# Patient Record
Sex: MALE | Race: WHITE
[De-identification: names, ages, dates, MRNs, and addresses within clinical notes are randomized per-mention and may not be internally consistent; named-entity substitution may affect disease eponyms.]

---

## 2018-03-04 ENCOUNTER — HOSPITAL ENCOUNTER (EMERGENCY)
Dept: HOSPITAL 45 - C.EDB | Age: 63
Discharge: HOME | End: 2018-03-04
Payer: COMMERCIAL

## 2018-03-04 VITALS
BODY MASS INDEX: 34.83 KG/M2 | BODY MASS INDEX: 34.83 KG/M2 | HEIGHT: 70.51 IN | WEIGHT: 246.04 LBS | HEIGHT: 70.51 IN | WEIGHT: 246.04 LBS

## 2018-03-04 VITALS — DIASTOLIC BLOOD PRESSURE: 99 MMHG | SYSTOLIC BLOOD PRESSURE: 143 MMHG | HEART RATE: 63 BPM | OXYGEN SATURATION: 97 %

## 2018-03-04 VITALS — TEMPERATURE: 98.42 F

## 2018-03-04 DIAGNOSIS — Z79.82: ICD-10-CM

## 2018-03-04 DIAGNOSIS — R51: ICD-10-CM

## 2018-03-04 DIAGNOSIS — R10.9: ICD-10-CM

## 2018-03-04 DIAGNOSIS — I31.9: ICD-10-CM

## 2018-03-04 DIAGNOSIS — Z82.49: ICD-10-CM

## 2018-03-04 DIAGNOSIS — R06.09: ICD-10-CM

## 2018-03-04 DIAGNOSIS — R07.2: Primary | ICD-10-CM

## 2018-03-04 DIAGNOSIS — R03.0: ICD-10-CM

## 2018-03-04 LAB
ALBUMIN SERPL-MCNC: 3.8 GM/DL (ref 3.4–5)
ALP SERPL-CCNC: 87 U/L (ref 45–117)
ALT SERPL-CCNC: 33 U/L (ref 12–78)
AST SERPL-CCNC: 21 U/L (ref 15–37)
BASOPHILS # BLD: 0.05 K/UL (ref 0–0.2)
BASOPHILS NFR BLD: 0.9 %
BUN SERPL-MCNC: 16 MG/DL (ref 7–18)
CALCIUM SERPL-MCNC: 8.9 MG/DL (ref 8.5–10.1)
CK MB SERPL-MCNC: 1.8 NG/ML (ref 0.5–3.6)
CO2 SERPL-SCNC: 28 MMOL/L (ref 21–32)
CREAT SERPL-MCNC: 1.14 MG/DL (ref 0.6–1.4)
EOS ABS #: 0.09 K/UL (ref 0–0.5)
EOSINOPHIL NFR BLD AUTO: 197 K/UL (ref 130–400)
GLUCOSE SERPL-MCNC: 140 MG/DL (ref 70–99)
HCT VFR BLD CALC: 47 % (ref 42–52)
HGB BLD-MCNC: 16.7 G/DL (ref 14–18)
IG#: 0.02 K/UL (ref 0–0.02)
IMM GRANULOCYTES NFR BLD AUTO: 25.2 %
INR PPP: 1 (ref 0.9–1.1)
LIPASE: 202 U/L (ref 73–393)
LYMPHOCYTES # BLD: 1.46 K/UL (ref 1.2–3.4)
MCH RBC QN AUTO: 30 PG (ref 25–34)
MCHC RBC AUTO-ENTMCNC: 35.5 G/DL (ref 32–36)
MCV RBC AUTO: 84.4 FL (ref 80–100)
MONO ABS #: 0.51 K/UL (ref 0.11–0.59)
MONOCYTES NFR BLD: 8.8 %
NEUT ABS #: 3.67 K/UL (ref 1.4–6.5)
NEUTROPHILS # BLD AUTO: 1.6 %
NEUTROPHILS NFR BLD AUTO: 63.2 %
PMV BLD AUTO: 10.2 FL (ref 7.4–10.4)
POTASSIUM SERPL-SCNC: 3.8 MMOL/L (ref 3.5–5.1)
PROT SERPL-MCNC: 7.3 GM/DL (ref 6.4–8.2)
PTT PATIENT: 27.3 SECONDS (ref 21–31)
RED CELL DISTRIBUTION WIDTH CV: 12.3 % (ref 11.5–14.5)
RED CELL DISTRIBUTION WIDTH SD: 37.4 FL (ref 36.4–46.3)
SODIUM SERPL-SCNC: 138 MMOL/L (ref 136–145)
WBC # BLD AUTO: 5.8 K/UL (ref 4.8–10.8)

## 2018-03-04 NOTE — DIAGNOSTIC IMAGING REPORT
SINGLE VIEW CHEST



CLINICAL HISTORY:  Generalized abdominal pain.



FINDINGS: An AP, portable, upright chest radiograph is compared to study dated

4/23/2006. The examination is degraded by portable technique and patient

rotation.  The heart is mildly enlarged. The pulmonary vasculature is

noncongested. Chronic interstitial thickening is similar to previous. No

airspace consolidation or large pleural effusion is identified. No pneumothorax

is seen. The bony thorax is grossly intact.



IMPRESSION: Mild cardiac enlargement with no acute cardiopulmonary abnormality.







Electronically signed by:  Eliezer Thibodeaux M.D.

3/4/2018 9:40 AM



Dictated Date/Time:  3/4/2018 9:39 AM

## 2018-03-04 NOTE — EMERGENCY ROOM VISIT NOTE
History


Report prepared by José:  Prakash Hoffman


Under the Supervision of:  Dr. John Mcduffie D.O.


First contact with patient:  09:11


Chief Complaint:  CHEST PAIN


Stated Complaint:  BURNING IN CHEST





History of Present Illness


The patient is a 63 year old male who presents to the Emergency Room with 

complaints of worsening waxing and waning chest pain for the past few days. He 

states that the pain is a burning sensation, and he states that it is worsened 

with laying down and improved with standing up. The patient notes that the pain 

went into his abdomen and head yesterday, and he notes that he is more short of 

breath with exertion recently. He notes that he took 1 81mg aspirin this morning

, and this did not help with the pain. He denies any leg swelling, black stools

, and bloody stools. He notes that the pain is not worse with exertion. The 

patient states that he takes atenolol and losartan. The patient states that he 

had a stress test a long time ago, and he denies any history of heart attacks, 

though he states that he had a history of inflammation around the heart. He 

reports that he does not smoke or drink alcohol, he has not had any surgeries, 

and he denies any reflux symptoms. The patient states that his mother had to 

have stents put in due to heart problems in her 60s.





   Source of History:  patient


   Onset:  the past few days


   Position:  chest


   Quality:  burning


   Timing:  waxes/wanes, worsening


   Modifying Factors (Worsening):  other (laying down)


   Modifying Factors (Relieving):  other (standing up)


   Associated Symptoms:  + SOB





Review of Systems


See HPI for pertinent positives & negatives. A total of 10 systems reviewed and 

were otherwise negative.





Past Medical & Surgical


Medical Problems:


(1) Pericarditis








Family History





Heart disease





Social History


Smoking Status:  Never Smoker


Marital Status:  


Housing Status:  lives with family


Occupation Status:  retired





Current/Historical Medications


Scheduled


Acetaminophen (Tylenol), 3 MG PO UD


Aspirin (Aspirin Ec), 81 MG PO QAM


Atenolol (Tenormin), 100 MG PO QAM


Losartan Potassium (Cozaar), 100 MG PO QAM


Omeprazole (Prilosec), 20 MG PO DAILY


Pravastatin (Pravachol ), 10 MG PO QAM





Scheduled PRN


Fluoxetine (Prozac), 20 MG PO DAILY PRN for Anxiety





Allergies


Coded Allergies:  


     No Known Allergies (Unverified , 3/4/18)





Physical Exam


Vital Signs











  Date Time  Temp Pulse Resp B/P (MAP) Pulse Ox O2 Delivery O2 Flow Rate FiO2


 


3/4/18 12:37  63 18 143/99 97 Room Air  


 


3/4/18 10:08  61 18 153/93  Room Air  


 


3/4/18 09:11  68      


 


3/4/18 08:56 36.9 62 18 176/100 97 Room Air  











Physical Exam


GENERAL:  Patient is awake, alert, and in no acute distress. Patient is resting 

comfortably and showing no signs of anxiety


EYES: The conjunctivae are clear.  The pupils are round and reactive. 


EARS, NOSE, MOUTH AND THROAT: The nose is without any evidence of any 

deformity. Mucous membranes are moist tongue is midline 


NECK: The neck is nontender and supple.


RESPIRATORY: Normal respiratory effort is noted there is no evidence of 

wheezing rhonchi or rales


CARDIOVASCULAR:  Regular rate and rhythm noted there no murmurs rubs or gallops 

normal S1 normal S2 


GASTROINTESTINAL: The abdomen is soft. Bowel sounds are present in all 

quadrants. Abdomen is nontender


MUSCULOSKELETAL/EXTREMITIES: There is no evidence of gross deformity full range 

of motion is noted in the hips and shoulders


SKIN: There is no obvious evidence of any rash. There are no petechiae, pallor 

or cyanosis noted. 


NEUROLOGIC:  Patient is awake alert and oriented x3





Medical Decision & Procedures


ER Provider


Diagnostic Interpretation:


Radiology results as stated below per my review and radiologist interpretation:














SINGLE VIEW CHEST





CLINICAL HISTORY:  Generalized abdominal pain.





FINDINGS: An AP, portable, upright chest radiograph is compared to study dated


4/23/2006. The examination is degraded by portable technique and patient


rotation.  The heart is mildly enlarged. The pulmonary vasculature is


noncongested. Chronic interstitial thickening is similar to previous. No


airspace consolidation or large pleural effusion is identified. No pneumothorax


is seen. The bony thorax is grossly intact.





IMPRESSION: Mild cardiac enlargement with no acute cardiopulmonary abnormality.





Electronically signed by:  Eliezer Thibodeaux M.D.


3/4/2018 9:40 AM





Dictated Date/Time:  3/4/2018 9:39 AM





Laboratory Results


3/4/18 09:00








Red Blood Count 5.57, Mean Corpuscular Volume 84.4, Mean Corpuscular Hemoglobin 

30.0, Mean Corpuscular Hemoglobin Concent 35.5, Mean Platelet Volume 10.2, 

Neutrophils (%) (Auto) 63.2, Lymphocytes (%) (Auto) 25.2, Monocytes (%) (Auto) 

8.8, Eosinophils (%) (Auto) 1.6, Basophils (%) (Auto) 0.9, Neutrophils # (Auto) 

3.67, Lymphocytes # (Auto) 1.46, Monocytes # (Auto) 0.51, Eosinophils # (Auto) 

0.09, Basophils # (Auto) 0.05





3/4/18 09:00

















Test


  3/4/18


09:00 3/4/18


10:10 3/4/18


11:30


 


White Blood Count


  5.80 K/uL


(4.8-10.8) 


  


 


 


Red Blood Count


  5.57 M/uL


(4.7-6.1) 


  


 


 


Hemoglobin


  16.7 g/dL


(14.0-18.0) 


  


 


 


Hematocrit 47.0 % (42-52)   


 


Mean Corpuscular Volume


  84.4 fL


() 


  


 


 


Mean Corpuscular Hemoglobin


  30.0 pg


(25-34) 


  


 


 


Mean Corpuscular Hemoglobin


Concent 35.5 g/dl


(32-36) 


  


 


 


Platelet Count


  197 K/uL


(130-400) 


  


 


 


Mean Platelet Volume


  10.2 fL


(7.4-10.4) 


  


 


 


Neutrophils (%) (Auto) 63.2 %   


 


Lymphocytes (%) (Auto) 25.2 %   


 


Monocytes (%) (Auto) 8.8 %   


 


Eosinophils (%) (Auto) 1.6 %   


 


Basophils (%) (Auto) 0.9 %   


 


Neutrophils # (Auto)


  3.67 K/uL


(1.4-6.5) 


  


 


 


Lymphocytes # (Auto)


  1.46 K/uL


(1.2-3.4) 


  


 


 


Monocytes # (Auto)


  0.51 K/uL


(0.11-0.59) 


  


 


 


Eosinophils # (Auto)


  0.09 K/uL


(0-0.5) 


  


 


 


Basophils # (Auto)


  0.05 K/uL


(0-0.2) 


  


 


 


RDW Standard Deviation


  37.4 fL


(36.4-46.3) 


  


 


 


RDW Coefficient of Variation


  12.3 %


(11.5-14.5) 


  


 


 


Immature Granulocyte % (Auto) 0.3 %   


 


Immature Granulocyte # (Auto)


  0.02 K/uL


(0.00-0.02) 


  


 


 


Erythrocyte Sedimentation Rate 4 mm/hr (0-14)   


 


Prothrombin Time


  10.5 SECONDS


(9.0-12.0) 


  


 


 


Prothromb Time International


Ratio 1.0 (0.9-1.1) 


  


  


 


 


Activated Partial


Thromboplast Time 27.3 SECONDS


(21.0-31.0) 


  


 


 


Partial Thromboplastin Ratio 1.1   


 


Anion Gap


  6.0 mmol/L


(3-11) 


  


 


 


Est Creatinine Clear Calc


Drug Dose 84.7 ml/min 


  


  


 


 


Estimated GFR (


American) 79.4 


  


  


 


 


Estimated GFR (Non-


American 68.5 


  


  


 


 


BUN/Creatinine Ratio 14.2 (10-20)   


 


Calcium Level


  8.9 mg/dl


(8.5-10.1) 


  


 


 


Total Bilirubin


  1.1 mg/dl


(0.2-1) 


  


 


 


Direct Bilirubin


  0.2 mg/dl


(0-0.2) 


  


 


 


Aspartate Amino Transf


(AST/SGOT) 21 U/L (15-37) 


  


  


 


 


Alanine Aminotransferase


(ALT/SGPT) 33 U/L (12-78) 


  


  


 


 


Alkaline Phosphatase


  87 U/L


() 


  


 


 


Total Creatine Kinase


  66 U/L


() 


  


 


 


Creatine Kinase MB


  1.8 ng/ml


(0.5-3.6) 


  


 


 


Creatine Kinase MB Ratio 2.7 (0-3.0)   


 


C-Reactive Protein


  < 0.29 mg/dl


(0-0.29) 


  


 


 


Total Protein


  7.3 gm/dl


(6.4-8.2) 


  


 


 


Albumin


  3.8 gm/dl


(3.4-5.0) 


  


 


 


Lipase


  202 U/L


() 


  


 


 


Urine Color  YELLOW  


 


Urine Appearance  CLEAR (CLEAR)  


 


Urine pH  8.0 (4.5-7.5)  


 


Urine Specific Gravity


  


  1.006


(1.000-1.030) 


 


 


Urine Protein  NEG (NEG)  


 


Urine Glucose (UA)  NEG (NEG)  


 


Urine Ketones  NEG (NEG)  


 


Urine Occult Blood  NEG (NEG)  


 


Urine Nitrite  NEG (NEG)  


 


Urine Bilirubin  NEG (NEG)  


 


Urine Urobilinogen  NEG (NEG)  


 


Urine Leukocyte Esterase  NEG (NEG)  


 


Troponin I


  


  


  < 0.015 ng/ml


(0-0.045)








Laboratory results per my review.





Medications Administered











 Medications


  (Trade)  Dose


 Ordered  Sig/Kishore


 Route  Start Time


 Stop Time Status Last Admin


Dose Admin


 


 Al Hydroxide/Mg


 Hydroxide


  (Maalox Susp)  30 ml  NOW  STAT


 PO  3/4/18 09:22


 3/4/18 09:24 DC 3/4/18 09:55


30 ML


 


 Aspirin


  (Aspirin Chew)  162 mg  NOW  STAT


 PO  3/4/18 09:22


 3/4/18 09:24 DC 3/4/18 09:55


162 MG


 


 Pantoprazole


 Sodium


  (Protonix Tab)  40 mg  NOW  STAT


 PO  3/4/18 09:26


 3/4/18 09:27 DC 3/4/18 09:55


40 MG











ECG Per My Interpretation


Indication:  chest pain


Rate (beats per minute):  68


Rhythm:  normal sinus


Findings:  Q waves (Inferior), other (No PVCs, no ST segment abnormality)


Comparison ECG Date:  4/23/2006


Change:  no significant change


Change:


Repeat EKG: Normal sinus rhythm. No PVC. No acute ST segment abnormality. 

Inferior Q waves. No change from earlier tracing.





ED Course


0911: The patient was evaluated in room B6. A complete history and physical 

examination were performed. 





0922: Aspirin 162mg PO, Maalox Susp 30ml PO





0926: Protonix 40mg PO





1220: Upon reevaluation, the patient is doing well. I discussed the results and 

treatment plan with him. He verbalized agreement of the treatment plan. He was 

discharged home.





Medical Decision


Differential diagnosis:


Etiologies such as cardiac ischemia, aortic dissection, pulmonary embolism, 

pneumonia, pneumothorax, musculoskeletal, infections, pericarditis, myocarditis

, esophageal rupture, gastrointestinal, as well as others were entertained. 





Nursing notes reviewed.





The patient is a 63-year-old male who presented to the emergency department for 

an evaluation of chest discomfort.  The patient describes chest discomfort 

which is mostly in the epigastric region and the lower external region.  The 

patient states this is not exertional.  He states his pain is worsened with 

lying flat.  At this time I do not feel that this is cardiac in nature.  The 

patient's heart score was low.  The patient had serial EKGs as well as serial 

troponin measurements while he was in the emergency department.  I discussed 

patient's laboratory and radiographic studies with him.  I discussed the 

limitations of the emergency department workup for chest pain with him.  He was 

encouraged to rest and avoid any strenuous activity.  He is also encouraged to 

call his family doctor to schedule a follow-up appointment.  I also recommended 

that he start taking medicines for reflux in case this would help but return to 

the emergency department immediately if symptoms change worsen or the need 

arises.





Medication Reconcilliation


Current Medication List:  was personally reviewed by me





Blood Pressure Screening


Patient's blood pressure:  Elevated blood pressure


Blood pressure disposition:  Elevated BP felt to be situational





Impression





 Primary Impression:  


 Substernal chest pain





Scribe Attestation


The scribe's documentation has been prepared under my direction and personally 

reviewed by me in its entirety. I confirm that the note above accurately 

reflects all work, treatment, procedures, and medical decision making performed 

by me.





Departure Information


Dispostion


Home / Self-Care





Prescriptions





Omeprazole (Prilosec) 20 Mg Capcr


20 MG PO DAILY, #30 CAP


   Prov: John Mcduffie, DO         3/4/18





Referrals


Cheryl Garsia MD (PCP)





Forms


Call Back Authorization, HOME CARE DOCUMENTATION FORM,                         

                                       IMPORTANT VISIT INFORMATION, Work 

Instructions





Patient Instructions


ED Chest Pain Atypical Unkn Cause, My [facility]





Additional Instructions





Rest and avoid any strenuous activity.  Continue all medications as prescribed.

  Consider using Maalox or Mylanta as directed for symptomatically.  Return to 

the emergency department immediately if symptoms change worsen or the need 

arises.  Discussed the possibility that you may require further study such as a 

stress test with your family doctor to further evaluate the cause of your 

discomfort